# Patient Record
Sex: MALE | URBAN - METROPOLITAN AREA
[De-identification: names, ages, dates, MRNs, and addresses within clinical notes are randomized per-mention and may not be internally consistent; named-entity substitution may affect disease eponyms.]

---

## 2022-03-21 ENCOUNTER — OFFICE VISIT (OUTPATIENT)
Dept: URBAN - METROPOLITAN AREA CLINIC 48 | Facility: CLINIC | Age: 22
End: 2022-03-21

## 2022-03-21 DIAGNOSIS — T15.02XA FOREIGN BODY IN CORNEA, LEFT EYE: Primary | ICD-10-CM

## 2022-03-21 PROCEDURE — 99204 OFFICE O/P NEW MOD 45 MIN: CPT | Performed by: OPHTHALMOLOGY

## 2022-03-21 RX ORDER — ERYTHROMYCIN 5 MG/G
OINTMENT OPHTHALMIC
Qty: 3.5 | Refills: 1 | Status: ACTIVE
Start: 2022-03-21

## 2022-03-21 ASSESSMENT — INTRAOCULAR PRESSURE
OS: 10
OD: 15

## 2022-03-21 NOTE — IMPRESSION/PLAN
Impression: Foreign body in cornea, left eye: T15.02xA. Plan: Large Metal foreign body @ 5 o'clock removed, was in deep. Some rust left over from metal foreign body. Advised patient to not apply water to OS for next 24 hrs Start: Erythromycin Jalyn QID OS x1wk RTC PRN